# Patient Record
Sex: FEMALE | Race: WHITE | URBAN - METROPOLITAN AREA
[De-identification: names, ages, dates, MRNs, and addresses within clinical notes are randomized per-mention and may not be internally consistent; named-entity substitution may affect disease eponyms.]

---

## 2023-01-31 ENCOUNTER — OFFICE VISIT (OUTPATIENT)
Dept: URBAN - METROPOLITAN AREA CLINIC 21 | Facility: CLINIC | Age: 78
End: 2023-01-31
Payer: MEDICARE

## 2023-01-31 DIAGNOSIS — H40.1131 PRIMARY OPEN-ANGLE GLAUCOMA, MILD STAGE, BILATERAL: ICD-10-CM

## 2023-01-31 DIAGNOSIS — H25.13 AGE-RELATED NUCLEAR CATARACT, BILATERAL: Primary | ICD-10-CM

## 2023-01-31 DIAGNOSIS — H52.223 REGULAR ASTIGMATISM, BILATERAL: ICD-10-CM

## 2023-01-31 PROCEDURE — 92136 OPHTHALMIC BIOMETRY: CPT | Performed by: OPHTHALMOLOGY

## 2023-01-31 PROCEDURE — 99204 OFFICE O/P NEW MOD 45 MIN: CPT | Performed by: OPHTHALMOLOGY

## 2023-01-31 ASSESSMENT — INTRAOCULAR PRESSURE
OS: 17
OD: 16

## 2023-01-31 NOTE — IMPRESSION/PLAN
Impression: Age-related nuclear cataract, bilateral: H25.13. Plan: Advised patient of condition. Risks, benefits, and alternatives of cataract surgery discussed. Patient elects to proceed with surgery OD first then OS to follow. Sky, pentacam, argos, and OCT were completed today. Patient is a candidate for the custom care package with a STD IOL, target distance. Patient is aware glasses may be needed to fine tune distance and reading. Patient is a candidate for the forever young package with a panoptix IOL. Patient is aware of the possibility of glare/halos at night post op.

## 2023-01-31 NOTE — IMPRESSION/PLAN
Impression: Primary open-angle glaucoma, mild stage, bilateral: H40.1131. Plan: Condition and IOP are stable today. No changes being made to current treatment at this time. Continue Latanoprost QHS, OU. Emphasized and explained compliance. Reassured patient of current condition and treatment and discussed risks of glaucoma progression. Will continue to monitor IOP. RTC:

## 2023-04-14 ENCOUNTER — POST-OPERATIVE VISIT (OUTPATIENT)
Dept: URBAN - METROPOLITAN AREA CLINIC 21 | Facility: CLINIC | Age: 78
End: 2023-04-14
Payer: MEDICARE

## 2023-04-14 DIAGNOSIS — Z48.810 ENCOUNTER FOR SURGICAL AFTERCARE FOLLOWING SURGERY ON A SENSE ORGAN: Primary | ICD-10-CM

## 2023-04-14 RX ORDER — ACETAZOLAMIDE 250 MG/1
250 MG TABLET ORAL
Qty: 0 | Refills: 0 | Status: ACTIVE
Start: 2023-04-14

## 2023-04-14 RX ORDER — DORZOLAMIDE HYDROCHLORIDE AND TIMOLOL MALEATE 20; 5 MG/ML; MG/ML
SOLUTION/ DROPS OPHTHALMIC
Qty: 10 | Refills: 0 | Status: ACTIVE
Start: 2023-04-14

## 2023-04-14 ASSESSMENT — INTRAOCULAR PRESSURE
OD: 38
OD: 24

## 2023-04-14 NOTE — IMPRESSION/PLAN
Impression: S/P Cataract Extraction by phacoemulsification with IOL placement; Femto (LenSx); ORA OD - 1 Day. Encounter for surgical aftercare following surgery on a sense organ  Z48.810. Plan: The surgical eye(s) had elevated IOP at 38. Instilled topical Combigan in office and waited 15 minutes and re-checked IOP at 38. Rxed Cosopt BID x 1 week. Patient to start Acetazolamide 1/2 tab po QID until they return to the office on Tuesday. Patient advised to stay well hydrated. Continue to follow current drop plan and post operative instructions. Recommend artificial tears throughout post operative period. RTC in 4 days for IOP check, call to come in on Monday if patient is still experience pain or worsening vision. Patient states she has a high pain threshold, although felt pain throughout the procedure yesterday. She would like to discuss increasing the amount of medication given on the second eye.

## 2023-04-18 ENCOUNTER — POST-OPERATIVE VISIT (OUTPATIENT)
Dept: URBAN - METROPOLITAN AREA CLINIC 21 | Facility: CLINIC | Age: 78
End: 2023-04-18
Payer: MEDICARE

## 2023-04-18 DIAGNOSIS — H25.12 AGE-RELATED NUCLEAR CATARACT, LEFT EYE: ICD-10-CM

## 2023-04-18 DIAGNOSIS — Z48.810 ENCOUNTER FOR SURGICAL AFTERCARE FOLLOWING SURGERY ON A SENSE ORGAN: Primary | ICD-10-CM

## 2023-04-18 DIAGNOSIS — H52.222 REGULAR ASTIGMATISM, LEFT EYE: ICD-10-CM

## 2023-04-18 ASSESSMENT — VISUAL ACUITY: OD: 20/20

## 2023-04-18 ASSESSMENT — INTRAOCULAR PRESSURE: OD: 14

## 2023-04-18 NOTE — IMPRESSION/PLAN
Impression: Age-related nuclear cataract, left eye: H25.12. Plan: Advised patient of condition. Risks, benefits, and alternatives of cataract surgery discussed. Patient elects to proceed with surgery OS. Sky, pentacam, argos, and OCT were completed today. Patient elects to proceed with the custom care package with a STD IOL, target distance. Patient is aware glasses may be needed to fine tune distance and reading. Patient states she experienced some pain/pressure through out the surgery, discussed a possible anesthetic block OS.

## 2023-04-18 NOTE — IMPRESSION/PLAN
Impression: S/P Cataract Extraction by phacoemulsification with IOL placement; Femto (LenSx); ORA OD - 5 Days. Encounter for surgical aftercare following surgery on a sense organ  Z48.810. Plan: IOP is stable today, patient can discontinue Diamox and Cosopt. Continue Imprimis and latanoprost as instructed. Patient elects to proceed with second eye cataract surgery, OS. 
Patient to follow up in 1 week for an IOP check prior to Sx.

## 2023-04-27 ENCOUNTER — PROCEDURE (OUTPATIENT)
Dept: URBAN - METROPOLITAN AREA SURGERY 8 | Facility: SURGERY | Age: 78
End: 2023-04-27
Payer: MEDICARE

## 2023-04-27 DIAGNOSIS — H52.222 REGULAR ASTIGMATISM, LEFT EYE: ICD-10-CM

## 2023-04-27 DIAGNOSIS — H25.12 AGE-RELATED NUCLEAR CATARACT, LEFT EYE: Primary | ICD-10-CM

## 2023-04-27 PROCEDURE — 66984 XCAPSL CTRC RMVL W/O ECP: CPT | Performed by: OPHTHALMOLOGY

## 2023-04-28 ENCOUNTER — POST-OPERATIVE VISIT (OUTPATIENT)
Dept: URBAN - METROPOLITAN AREA CLINIC 21 | Facility: CLINIC | Age: 78
End: 2023-04-28
Payer: MEDICARE

## 2023-04-28 DIAGNOSIS — Z96.1 PRESENCE OF INTRAOCULAR LENS: Primary | ICD-10-CM

## 2023-04-28 ASSESSMENT — INTRAOCULAR PRESSURE
OS: 39
OD: 34

## 2023-04-28 NOTE — IMPRESSION/PLAN
Impression: S/P Cataract Extraction by phacoemulsification with IOL placement; Femto (LenSx); ORA OS - 1 Day. Presence of intraocular lens  Z96.1. Plan: The surgical eye(s) had elevated IOP at 34 OD 39 OS. Instilled topical Combigan in office. Rxed Cosopt BID x 1 week. Continue to follow current drop plan and post operative instructions. Recommend artificial tears throughout post operative period. RTC in 2-3 days for IOP check. Patient to use Imprimis QID OS for 5 days, then BID OS for 5 days Ketorolac QID OD Combigan BID OU Latanoprost QHS OU Diamox 250mg po BID for 3 days, Per Dr. Juany Weaver. Patient to follow up on Tuesday with Dr. Juany Weaver.

## 2023-05-02 ENCOUNTER — POST-OPERATIVE VISIT (OUTPATIENT)
Dept: URBAN - METROPOLITAN AREA CLINIC 21 | Facility: CLINIC | Age: 78
End: 2023-05-02
Payer: MEDICARE

## 2023-05-02 DIAGNOSIS — Z96.1 PRESENCE OF INTRAOCULAR LENS: Primary | ICD-10-CM

## 2023-05-02 ASSESSMENT — INTRAOCULAR PRESSURE
OS: 10
OD: 11

## 2023-05-02 NOTE — IMPRESSION/PLAN
Impression: S/P Cataract Extraction by phacoemulsification with IOL placement; Femto (LenSx); ORA OS - 5 Days. Presence of intraocular lens  Z96.1. Plan: IOP is stable today. Patient to use Latanoprost QHS, OU Dorzolamide-Timolol QAM OU, Imprimis 2x per day until gone OS, then start Ketorolac QID OS, 
Ketorolac 2x per day in the right eye. Diamox is discontinued Patient to follow up in 1 week.

## 2023-05-09 ENCOUNTER — POST-OPERATIVE VISIT (OUTPATIENT)
Dept: URBAN - METROPOLITAN AREA CLINIC 21 | Facility: CLINIC | Age: 78
End: 2023-05-09
Payer: MEDICARE

## 2023-05-09 DIAGNOSIS — Z96.1 PRESENCE OF INTRAOCULAR LENS: Primary | ICD-10-CM

## 2023-05-09 RX ORDER — KETOROLAC TROMETHAMINE 4 MG/ML
0.4 % SOLUTION/ DROPS OPHTHALMIC
Qty: 5 | Refills: 1 | Status: ACTIVE
Start: 2023-05-09

## 2023-05-09 ASSESSMENT — INTRAOCULAR PRESSURE
OD: 17
OS: 18

## 2023-05-09 ASSESSMENT — VISUAL ACUITY
OS: 20/20
OD: 20/20

## 2023-05-09 NOTE — IMPRESSION/PLAN
Impression: S/P Cataract Extraction by phacoemulsification with IOL placement; Femto (LenSx); ORA OS - 12 Days. Presence of intraocular lens  Z96.1. Plan: IOP is stable today. Patient is healing well. Ketorolac BID OS Ketorolac 1x/ day OD Latanoprost QHS, OU Discontinue Dorzolomide Timolol OU Patient to follow up in 1 week fro IOP check due to discontinuing Dorzolomide Timolol.

## 2023-05-16 ENCOUNTER — POST-OPERATIVE VISIT (OUTPATIENT)
Dept: URBAN - METROPOLITAN AREA CLINIC 21 | Facility: CLINIC | Age: 78
End: 2023-05-16
Payer: MEDICARE

## 2023-05-16 DIAGNOSIS — Z96.1 PRESENCE OF INTRAOCULAR LENS: Primary | ICD-10-CM

## 2023-05-16 ASSESSMENT — INTRAOCULAR PRESSURE
OS: 20
OD: 17

## 2023-05-16 ASSESSMENT — VISUAL ACUITY: OS: 20/20

## 2023-05-16 NOTE — IMPRESSION/PLAN
Impression: S/P Cataract Extraction by phacoemulsification with IOL placement; Femto (LenSx); ORA OS - 19 Days. Presence of intraocular lens  Z96.1. Plan: Patient is healing well. Patient to D/C Ketorolac OD and taper to QD OS. Patient to continue Latanoprost QHS OU as directed. Keep scheduled appt as directed.